# Patient Record
(demographics unavailable — no encounter records)

---

## 2025-05-05 NOTE — PHYSICAL EXAM
[Well Developed] : well developed [Well Nourished] : well nourished [No Acute Distress] : no acute distress [Normal Conjunctiva] : normal conjunctiva [Normal Venous Pressure] : normal venous pressure [Normal S1, S2] : normal S1, S2 [No Murmur] : no murmur [No Rub] : no rub [No Gallop] : no gallop [Clear Lung Fields] : clear lung fields [Good Air Entry] : good air entry [No Respiratory Distress] : no respiratory distress  [Soft] : abdomen soft [Non Tender] : non-tender [No Masses/organomegaly] : no masses/organomegaly [Normal Bowel Sounds] : normal bowel sounds [Normal Gait] : normal gait [No Edema] : no edema [No Cyanosis] : no cyanosis [No Clubbing] : no clubbing [No Varicosities] : no varicosities [No Rash] : no rash [No Skin Lesions] : no skin lesions [Moves all extremities] : moves all extremities [No Focal Deficits] : no focal deficits [Normal Speech] : normal speech [Alert and Oriented] : alert and oriented [Normal memory] : normal memory [de-identified] : (+) Carotid Bruit

## 2025-05-05 NOTE — HISTORY OF PRESENT ILLNESS
[FreeTextEntry1] : LUANNE CUEVA is a 61-year-old female, with a PMHx significant for HTN, HLD, and asthma, who presents today for her final follow-up visit. Patient reports she is moving to [facility], near [place]. Denies any new complaints and reports she feels well. Labs reviewed and are WNL. Prior CAC score was 132 (83%) in 5/2024. Otherwise: (-) chest pain, (-) SOB.  Family History: (+) DM: Mother, Father, (+) HTN: Mother, Father, (+) HLD: Mother, Father, (+) Heart disease: Mother, Father, (+) Brain CA: Father, (+) Depression: Mother.   Social History: (-) smoking, (+) EtOH: 3 drinks/week, (-) illicit drug use.

## 2025-05-05 NOTE — DISCUSSION/SUMMARY
[EKG obtained to assist in diagnosis and management of assessed problem(s)] : EKG obtained to assist in diagnosis and management of assessed problem(s) [FreeTextEntry1] : EKG performed today was unremarkable.  HTN: Currently, the condition is controlled. There are no changes in medication management. Continue current medical therapy. Other planned treatments include an exercise regimen, weight loss, low sodium diet, and alcohol moderation.  HLD: Currently, the condition is stable. There are no changes in medication management. Continue current medical therapy. Other planned treatment includes diet modification, exercise, and weight loss.  Obesity: Discussed risks of obesity with the patient. Counselled on weight loss with dietary modification and increased physical activity/exercise.  Instructed to follow up as needed with cardiologist in [facility]. Plan was discussed with the patient.

## 2025-05-05 NOTE — PHYSICAL EXAM
[Well Developed] : well developed [Well Nourished] : well nourished [No Acute Distress] : no acute distress [Normal Conjunctiva] : normal conjunctiva [Normal Venous Pressure] : normal venous pressure [Normal S1, S2] : normal S1, S2 [No Murmur] : no murmur [No Rub] : no rub [No Gallop] : no gallop [Clear Lung Fields] : clear lung fields [Good Air Entry] : good air entry [No Respiratory Distress] : no respiratory distress  [Soft] : abdomen soft [Non Tender] : non-tender [No Masses/organomegaly] : no masses/organomegaly [Normal Bowel Sounds] : normal bowel sounds [Normal Gait] : normal gait [No Edema] : no edema [No Cyanosis] : no cyanosis [No Clubbing] : no clubbing [No Varicosities] : no varicosities [No Rash] : no rash [No Skin Lesions] : no skin lesions [Moves all extremities] : moves all extremities [No Focal Deficits] : no focal deficits [Normal Speech] : normal speech [Alert and Oriented] : alert and oriented [Normal memory] : normal memory [de-identified] : (+) Carotid Bruit

## 2025-05-05 NOTE — CARDIOLOGY SUMMARY
[de-identified] : 04/30/2025: NSR, normal axis, normal intervals, (-) ST-T wave changes. 02/29/2024: NSR, normal axis, normal intervals, (-) ST-T wave changes. ======================================================= [de-identified] : Treadmill Exercise Stress Echo - 03/28/2024: CONCLUSIONS 1. Left ventricular wall thickness is normal. Left ventricular systolic function is normal. There are no regional wall motion abnormalities seen. 2. Normal exercise stress echocardiogram. 3. The patient underwent stress testing using the Standard Jose Martin protocol. _ The patient exercised for 8 min 0 sec. Test was stopped due to target heart rate achieved and generalized fatigue. _ The patient achieved 10.3 METs which is consistent with good exercise capacity, considering age and other clinical characteristics. 4. No exercise induced electrocardiographic evidence of myocardial ischemia. 5. No echocardiographic evidence of exercise induced ischemia. 6. Baseline left ventricular cavity size was normal. Immediate post-stress, the left ventricular cavity size was normal. 7. Resting baseline LV ejection fraction was was estimated at approximately 60 to 65% (normal EF). Immediate post-stress the LV ejection fraction is was estimated at approximately 65 to 70% (normal EF). 8. Normal left ventricular diastolic function, with normal filling pressure. 9. Patient achieved 10.3 METs, which is consistent with good exercise capacity considering age and other clinical characteristics. 10. Normal left ventricular segmental wall motion and systolic function at rest. normal left ventricular segmental wall motion and systolic function immediate post-stress. 11. At rest there were no left ventricular regional wall motion abnormalities. Immediate post-stress there were no regional wall motion abnormalities seen. ======================================================= [de-identified] : TTE - 03/28/2024: CONCLUSIONS: 1. Left ventricular cavity is normal in size. Left ventricular systolic function is normal with an ejection fraction of 56 % by Richard's method of disks with an ejection fraction visually estimated at 55 to 60 %. 2. The left ventricular diastolic function is indeterminate. 3. Normal left and right atrial size. 4. Structurally normal pulmonic valve with normal leaflet excursion. 5. No significant valvular disease. 6. Structurally normal mitral valve with normal leaflet excursion. 7. The aortic root and ascending aorta appear normal in size. 8. No pericardial effusion seen. 9. Structurally normal tricuspid valve with normal leaflet excursion. 10. The interatrial septum appears intact. ======================================================= [de-identified] : Cardiac CT - 05/20/2024: IMPRESSION: -The Agatston coronary calcium score is 132. This is in the 83rd percentile for the patient's age and gender. -4 mm nodule along the posterior margin of the left major fissure. If there is history of smoking or high risk for neoplasia, 12 month follow-up non-contrast CT scan of the chest is recommended. Otherwise, if low risk, no further follow-up is necessary.  ======================================================= [de-identified] : B/L Carotid US - 03/28/2024: CONCLUSIONS: 1. Right: ICA 1-19% stenosis. 2. Left: ICA 1-19% stenosis. 3. Vertebral arteries: antegrade flow bilaterally. 4. Subclavian arteries: no significant atherosclerosis bilaterally. =======================================================

## 2025-05-05 NOTE — DISCUSSION/SUMMARY
[EKG obtained to assist in diagnosis and management of assessed problem(s)] : EKG obtained to assist in diagnosis and management of assessed problem(s) [FreeTextEntry1] : EKG performed today was unremarkable.  HTN: Currently, the condition is controlled. There are no changes in medication management. Continue current medical therapy. Other planned treatments include an exercise regimen, weight loss, low sodium diet, and alcohol moderation.  HLD: Currently, the condition is stable. There are no changes in medication management. Continue current medical therapy. Other planned treatment includes diet modification, exercise, and weight loss.  Obesity: Discussed risks of obesity with the patient. Counselled on weight loss with dietary modification and increased physical activity/exercise.  Instructed to follow up as needed with cardiologist in [facility]. Plan was discussed with the patient.

## 2025-05-05 NOTE — HISTORY OF PRESENT ILLNESS
[FreeTextEntry1] : LUANNE CUEVA is a 61-year-old female, with a PMHx significant for HTN, HLD, and asthma, who presents today for her final follow-up visit. Patient reports she is moving to [facility], near [place]. Denies any new complaints and reports she feels well. Labs reviewed and are WNL. Prior CAC score was 132 (83%) in 5/2024. Otherwise: (-) chest pain, (-) SOB.  Family History: (+) DM: Mother, Father, (+) HTN: Mother, Father, (+) HLD: Mother, Father, (+) Heart disease: Mother, Father, (+) Brain CA: Father, (+) Depression: Mother.   Social History: (-) smoking, (+) EtOH: 3 drinks/week, (-) illicit drug use.

## 2025-05-05 NOTE — CARDIOLOGY SUMMARY
[de-identified] : 04/30/2025: NSR, normal axis, normal intervals, (-) ST-T wave changes. 02/29/2024: NSR, normal axis, normal intervals, (-) ST-T wave changes. ======================================================= [de-identified] : Treadmill Exercise Stress Echo - 03/28/2024: CONCLUSIONS 1. Left ventricular wall thickness is normal. Left ventricular systolic function is normal. There are no regional wall motion abnormalities seen. 2. Normal exercise stress echocardiogram. 3. The patient underwent stress testing using the Standard Jose Martin protocol. _ The patient exercised for 8 min 0 sec. Test was stopped due to target heart rate achieved and generalized fatigue. _ The patient achieved 10.3 METs which is consistent with good exercise capacity, considering age and other clinical characteristics. 4. No exercise induced electrocardiographic evidence of myocardial ischemia. 5. No echocardiographic evidence of exercise induced ischemia. 6. Baseline left ventricular cavity size was normal. Immediate post-stress, the left ventricular cavity size was normal. 7. Resting baseline LV ejection fraction was was estimated at approximately 60 to 65% (normal EF). Immediate post-stress the LV ejection fraction is was estimated at approximately 65 to 70% (normal EF). 8. Normal left ventricular diastolic function, with normal filling pressure. 9. Patient achieved 10.3 METs, which is consistent with good exercise capacity considering age and other clinical characteristics. 10. Normal left ventricular segmental wall motion and systolic function at rest. normal left ventricular segmental wall motion and systolic function immediate post-stress. 11. At rest there were no left ventricular regional wall motion abnormalities. Immediate post-stress there were no regional wall motion abnormalities seen. ======================================================= [de-identified] : TTE - 03/28/2024: CONCLUSIONS: 1. Left ventricular cavity is normal in size. Left ventricular systolic function is normal with an ejection fraction of 56 % by Richard's method of disks with an ejection fraction visually estimated at 55 to 60 %. 2. The left ventricular diastolic function is indeterminate. 3. Normal left and right atrial size. 4. Structurally normal pulmonic valve with normal leaflet excursion. 5. No significant valvular disease. 6. Structurally normal mitral valve with normal leaflet excursion. 7. The aortic root and ascending aorta appear normal in size. 8. No pericardial effusion seen. 9. Structurally normal tricuspid valve with normal leaflet excursion. 10. The interatrial septum appears intact. ======================================================= [de-identified] : Cardiac CT - 05/20/2024: IMPRESSION: -The Agatston coronary calcium score is 132. This is in the 83rd percentile for the patient's age and gender. -4 mm nodule along the posterior margin of the left major fissure. If there is history of smoking or high risk for neoplasia, 12 month follow-up non-contrast CT scan of the chest is recommended. Otherwise, if low risk, no further follow-up is necessary.  ======================================================= [de-identified] : B/L Carotid US - 03/28/2024: CONCLUSIONS: 1. Right: ICA 1-19% stenosis. 2. Left: ICA 1-19% stenosis. 3. Vertebral arteries: antegrade flow bilaterally. 4. Subclavian arteries: no significant atherosclerosis bilaterally. =======================================================